# Patient Record
Sex: FEMALE | Race: WHITE | URBAN - METROPOLITAN AREA
[De-identification: names, ages, dates, MRNs, and addresses within clinical notes are randomized per-mention and may not be internally consistent; named-entity substitution may affect disease eponyms.]

---

## 2018-06-01 ENCOUNTER — THERAPY VISIT (OUTPATIENT)
Dept: PHYSICAL THERAPY | Facility: CLINIC | Age: 63
End: 2018-06-01
Payer: COMMERCIAL

## 2018-06-01 DIAGNOSIS — G89.29 CHRONIC RIGHT SHOULDER PAIN: Primary | ICD-10-CM

## 2018-06-01 DIAGNOSIS — M25.511 CHRONIC RIGHT SHOULDER PAIN: Primary | ICD-10-CM

## 2018-06-01 PROCEDURE — 97140 MANUAL THERAPY 1/> REGIONS: CPT | Mod: GP | Performed by: PHYSICAL THERAPIST

## 2018-06-01 PROCEDURE — 97161 PT EVAL LOW COMPLEX 20 MIN: CPT | Mod: GP | Performed by: PHYSICAL THERAPIST

## 2018-06-01 NOTE — PROGRESS NOTES
[facility] Initial Evaluation  Subjective:  Patient is a 63 year old female presenting with rehab right shoulder hpi.   Jessica Linton is a 63 year old female with a right shoulder condition.      This is a recurrent and chronic condition  Patient saw MD on 5/23/18 for R shoulder pain that has been hurting patient on/off for many years .    Patient reports pain:  In the joint (deep).  Radiates to: UT.  Pain is described as aching (dull) and is constant and reported as 2/10 and 6/10 (at its worst).  Associated symptoms:  Loss of motion/stiffness and loss of strength. Pain is worse in the A.M..  Symptoms are exacerbated by lying on extremity and using arm behind back and relieved by NSAID's.  Pain course: varies over time.  Special testing: none.      General health as reported by patient is good.  Pertinent medical history includes:  None.  Medical allergies: no.  Other surgeries include:  Orthopedic surgery (s/p bilateral feet).  Current medications:  Anti-depressants.  Current occupation is Clinical Supervisor Psych ED/inpatient.  Patient is working in normal job without restrictions.  Employment tasks: computer work.    Barriers include:  None as reported by the patient.    Red flags:  None as reported by the patient.                        Objective:  Standing Alignment:    Cervical/Thoracic:  Normal  Shoulder/UE:  Rounded shoulders                  Flexibility/Screens:     Upper Extremity:    Decreased left upper extremity flexibility at:  Pectoralis Major and Pectoralis Minor    Decreased right upper extremity flexibility present at:  Pectoralis Major and Pectoralis Minor                           Shoulder Evaluation:  ROM:  AROM:    Flexion:  Left:  170    Right:  170    Abduction:  Left: 180   Right:  180    Internal Rotation:  Left:  88    Right:  75              Extension/Internal Rotation:  Left:  T5    Right:  T7    PROM:  normal                                Strength:    Flexion:  Left:5/5    Pain: -    Right: 4/5      Pain:  -/+  Extension:  Left: 5/5     Pain:-    Right: 5/5     Pain:-/+  Abduction:  Left: 5/5   Pain:-    Right: 4/5      Pain:+  Adduction:  Left: 5/5     Pain:-    Right: 5/5      Pain:-/+  Internal Rotation:  Left:5/5      Pain:-    Right: 5/5      Pain:-/+  External Rotation:   Left:5/5      Pain:-     Horizontal Abduction:  Left:4+/5      Pain:-/+      Horizontal Adduction:  Right:/5     Pain:-/+      Stability Testing:  normal      Special Tests:      Right shoulder positive for the following special tests:Impingement  Right shoulder negative for the following special tests:Rotator cuff tear  Palpation:      Right shoulder tenderness present at: Subscapularis  Mobility Tests:      Glenohumeral posterior right:  Hypomobile  Glenohumeral inferior right:  Hypomobile      Scapulothoracic right:  Hypomobile                                       General     ROS    Assessment/Plan:    Patient is a 63 year old female with right shoulder complaints.    Patient has the following significant findings with corresponding treatment plan.                Diagnosis 1:  Chronic R shouller pain  Pain -  manual therapy, self management, education, directional preference exercise and home program  Decreased ROM/flexibility - manual therapy, therapeutic exercise, therapeutic activity and home program  Decreased joint mobility - manual therapy, therapeutic exercise, therapeutic activity and home program  Decreased strength - therapeutic exercise, therapeutic activities and home program  Decreased function - therapeutic activities and home program  Impaired posture - neuro re-education, therapeutic activities and home program    Therapy Evaluation Codes:   1) History comprised of:   Personal factors that impact the plan of care:      Profession.    Comorbidity factors that impact the plan of care are:      None.     Medications impacting care: None.  2) Examination of Body Systems comprised  of:   Body structures and functions that impact the plan of care:      Shoulder.   Activity limitations that impact the plan of care are:      Bathing, Driving, Dressing, Working, Sleeping and Laying down.  3) Clinical presentation characteristics are:   Stable/Uncomplicated.  4) Decision-Making    Low complexity using standardized patient assessment instrument and/or measureable assessment of functional outcome.  Cumulative Therapy Evaluation is: Low complexity.    Previous and current functional limitations:  (See Goal Flow Sheet for this information)    Short term and Long term goals: (See Goal Flow Sheet for this information)     Communication ability:  Patient appears to be able to clearly communicate and understand verbal and written communication and follow directions correctly.  Treatment Explanation - The following has been discussed with the patient:   RX ordered/plan of care  Anticipated outcomes  Possible risks and side effects  This patient would benefit from PT intervention to resume normal activities.   Rehab potential is good.    Frequency:  1 X week, once daily  Duration:  for 6 weeks  Discharge Plan:  Achieve all LTG.  Independent in home treatment program.  Reach maximal therapeutic benefit.    Please refer to the daily flowsheet for treatment today, total treatment time and time spent performing 1:1 timed codes.

## 2018-06-01 NOTE — MR AVS SNAPSHOT
After Visit Summary   6/1/2018    Jessica Linton    MRN: [id number]           Patient Information     Date Of Birth          1955        Visit Information        Provider Department      6/1/2018 3:30 PM Johana Marquez PT [facility]        Today's Diagnoses     Chronic right shoulder pain    -  1       Follow-ups after your visit        Your next 10 appointments already scheduled     Jun 08, 2018  4:10 PM CDT   SHANEL Extremity with Johana Jones PT   [facility] ([facility])    [address] [place] [id number]   [phone]            Jerson 15, 2018  4:10 PM CDT   SHANEL Extremity with Johana Jones PT   [facility] ([facility])    [address] [place] [id number]   [phone]              Who to contact     If you have questions or need follow up information about today's clinic visit or your schedule please contact [facility] directly at [phone].  Normal or non-critical lab and imaging results will be communicated to you by MyChart, letter or phone within 4 business days after the clinic has received the results. If you do not hear from us within 7 days, please contact the clinic through [facility] or phone. If you have a critical or abnormal lab result, we will notify you by phone as soon as possible.  Submit refill requests through [employer] or call your pharmacy and they will forward the refill request to us. Please allow 3 business days for your refill to be completed.          Additional Information About Your Visit        [facility] Information     [employer] lets you send messages to your doctor, view your test results, renew your prescriptions, schedule appointments and  "more. To sign up, go to [email or web address] . Click on \"Log in\" on the left side of the screen, which will take you to the Welcome page. Then click on \"Sign up Now\" on the right side of the page.     You will be asked to enter the access code listed below, as well as some personal information. Please follow the directions to create your username and password.     Your access code is: [email or web address]  Expires: 2018  4:45 PM     Your access code will  in 90 days. If you need help or a new code, please call your [place] clinic or [phone].        Care EveryWhere ID     This is your Care EveryWhere ID. This could be used by other organizations to access your [facility] medical records  [id number]         Blood Pressure from Last 3 Encounters:   No data found for BP    Weight from Last 3 Encounters:   No data found for Wt              We Performed the Following     HC PT EVAL, LOW COMPLEXITY     SHANEL INITIAL EVAL REPORT     MANUAL THER TECH,1+REGIONS,EA 15 MIN        Primary Care Provider    None Specified       No primary provider on file.        Equal Access to Services     [facility]: Hadii alvarado Reeder, buddy vicente, nicole villanueva, tere khalil . So [facility] [phone].    ATENCIÓN: Si habla español, tiene a merida disposición servicios gratuitos de asistencia lingüística. Llame al [phone].    We comply with applicable federal civil rights laws and [place] laws. We do not discriminate on the basis of race, color, national origin, age, disability, sex, sexual orientation, or gender identity.            Thank you!     Thank you for choosing [facility]  for your care. Our goal is always to provide you with excellent care. Hearing back from our patients is one way we can continue to improve our services. Please take a few minutes to complete the written survey that you may receive in the mail " after your visit with us. Thank you!             Your Updated Medication List - Protect others around you: Learn how to safely use, store and throw away your medicines at www.disposemymeds.org.      Notice  As of 6/1/2018  4:45 PM    You have not been prescribed any medications.

## 2018-06-01 NOTE — LETTER
[facility] PHYSICAL THERAP[facility]  [address]  [place] [address]  [phone]    2018    Re: Jessica Linton   :   1955  MRN:  [id number]   REFERRING PHYSICIAN:   Janie Ritter    [facility]    Date of Initial Evaluation:  2018  Visits:  Rxs Used: 1  Reason for Referral:  Chronic right shoulder pain    EVALUATION SUMMARY    Subjective:  Patient is a 63 year old female presenting with rehab right shoulder hpi.   Jessica Linton is a 63 year old female with a right shoulder condition. This is a recurrent and chronic condition  Patient saw MD on 18 for R shoulder pain that has been hurting patient on/off for many years.    Patient reports pain:  In the joint (deep).  Radiates to: UT.  Pain is described as aching (dull) and is constant and reported as 2/10 and 6/10 (at its worst).  Associated symptoms:  Loss of motion/stiffness and loss of strength. Pain is worse in the A.M..  Symptoms are exacerbated by lying on extremity and using arm behind back and relieved by NSAID's.  Pain course: varies over time.  Special testing: none.      General health as reported by patient is good.  Pertinent medical history includes:  None.  Medical allergies: no.  Other surgeries include:  Orthopedic surgery (s/p bilateral feet).  Current medications:  Anti-depressants.  Current occupation is Clinical Supervisor Psych ED/inpatient.  Patient is working in normal job without restrictions.  Employment tasks: computer work.  Barriers include:  None as reported by the patient.  Red flags:  None as reported by the patient.               Objective:  Standing Alignment:    Cervical/Thoracic:  Normal  Shoulder/UE:  Rounded shoulders  Flexibility/Screens:   Upper Extremity:    Decreased left upper extremity flexibility at:  Pectoralis Major and Pectoralis Minor  Decreased right upper extremity flexibility  present at:  Pectoralis Major and Pectoralis Minor    Shoulder Evaluation:  ROM:  AROM:    Flexion:  Left:  170    Right:  170  Abduction:  Left: 180   Right:  180  Re: Jessica Linton   :   1955    Internal Rotation:  Left:  88    Right:  75  Extension/Internal Rotation:  Left:  T5    Right:  T7    PROM:  normal  Strength:    Flexion: Left:5/5    Pain: -    Right: 4/5      Pain:  -/+  Extension:  Left: 5/5     Pain:-    Right: 5/5     Pain:-/+  Abduction:  Left: 5/5   Pain:-    Right: 4/5      Pain:+  Adduction:  Left: 5/5     Pain:-    Right: 5/5      Pain:-/+  Internal Rotation:  Left:5/      Pain:-    Right: 5/      Pain:-/+  External Rotation:   Left:      Pain:-     Horizontal Abduction:  Left:4+/5      Pain:-/+      Horizontal Adduction:  Right:/5     Pain:-/+  Stability Testing:  normal  Special Tests:    Right shoulder positive for the following special tests:Impingement  Right shoulder negative for the following special tests:Rotator cuff tear  Palpation:    Right shoulder tenderness present at: Subscapularis  Mobility Tests:    Glenohumeral posterior right:  Hypomobile  Glenohumeral inferior right:  Hypomobile    Scapulothoracic right:  Hypomobile    Assessment/Plan:    Patient is a 63 year old female with right shoulder complaints.    Patient has the following significant findings with corresponding treatment plan.                Diagnosis 1:  Chronic R shouller pain  Pain -  manual therapy, self management, education, directional preference exercise and home program  Decreased ROM/flexibility - manual therapy, therapeutic exercise, therapeutic activity and home program  Decreased joint mobility - manual therapy, therapeutic exercise, therapeutic activity and home program  Decreased strength - therapeutic exercise, therapeutic activities and home program  Decreased function - therapeutic activities and home program  Impaired posture - neuro re-education, therapeutic activities and home  program    Therapy Evaluation Codes:   1) History comprised of:   Personal factors that impact the plan of care:      Profession.    Comorbidity factors that impact the plan of care are:      None.     Medications impacting care: None.  2) Examination of Body Systems comprised of:   Body structures and functions that impact the plan of care:      Shoulder.   Activity limitations that impact the plan of care are:      Bathing, Driving, Dressing, Working, Sleeping and Laying down.  3) Clinical presentation characteristics are:   Stable/Uncomplicated.  Re: Jessica Linton   :   1955    4) Decision-Making    Low complexity using standardized patient assessment instrument and/or measureable assessment of functional outcome.  Cumulative Therapy Evaluation is: Low complexity.    Previous and current functional limitations:  (See Goal Flow Sheet for this information)    Short term and Long term goals: (See Goal Flow Sheet for this information)     Communication ability:  Patient appears to be able to clearly communicate and understand verbal and written communication and follow directions correctly.  Treatment Explanation - The following has been discussed with the patient:   RX ordered/plan of care  Anticipated outcomes  Possible risks and side effects  This patient would benefit from PT intervention to resume normal activities.   Rehab potential is good.    Frequency:  1 X week, once daily  Duration:  for 6 weeks  Discharge Plan:  Achieve all LTG.  Independent in home treatment program.  Reach maximal therapeutic benefit.    Thank you for your referral.    INQUIRIES  Therapist: Johana Morris   [facility]  [address] [address]  Phone: [phone]  Fax: [phone]

## 2018-06-08 ENCOUNTER — THERAPY VISIT (OUTPATIENT)
Dept: PHYSICAL THERAPY | Facility: CLINIC | Age: 63
End: 2018-06-08
Payer: COMMERCIAL

## 2018-06-08 DIAGNOSIS — M25.511 CHRONIC RIGHT SHOULDER PAIN: ICD-10-CM

## 2018-06-08 DIAGNOSIS — G89.29 CHRONIC RIGHT SHOULDER PAIN: ICD-10-CM

## 2018-06-08 PROCEDURE — 97140 MANUAL THERAPY 1/> REGIONS: CPT | Mod: GP | Performed by: PHYSICAL THERAPIST

## 2018-06-08 PROCEDURE — 97110 THERAPEUTIC EXERCISES: CPT | Mod: GP | Performed by: PHYSICAL THERAPIST

## 2025-05-19 ENCOUNTER — TRANSCRIBE ORDERS (OUTPATIENT)
Dept: OTHER | Age: 70
End: 2025-05-19

## 2025-05-19 DIAGNOSIS — M72.2 PLANTAR FASCIITIS OF RIGHT FOOT: Primary | ICD-10-CM
